# Patient Record
Sex: MALE | Race: WHITE | NOT HISPANIC OR LATINO | URBAN - METROPOLITAN AREA
[De-identification: names, ages, dates, MRNs, and addresses within clinical notes are randomized per-mention and may not be internally consistent; named-entity substitution may affect disease eponyms.]

---

## 2019-01-01 ENCOUNTER — EMERGENCY (EMERGENCY)
Facility: HOSPITAL | Age: 24
LOS: 1 days | End: 2019-01-01
Attending: EMERGENCY MEDICINE | Admitting: EMERGENCY MEDICINE
Payer: COMMERCIAL
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
## 2019-01-01 DIAGNOSIS — T71.161A: ICD-10-CM

## 2019-01-01 DIAGNOSIS — I46.9 CARDIAC ARREST, CAUSE UNSPECIFIED: ICD-10-CM

## 2019-01-01 LAB — GLUCOSE BLDC GLUCOMTR-MCNC: 27 MG/DL — CRITICAL LOW (ref 70–99)

## 2019-01-01 PROCEDURE — 99053 MED SERV 10PM-8AM 24 HR FAC: CPT

## 2019-01-01 PROCEDURE — 99285 EMERGENCY DEPT VISIT HI MDM: CPT | Mod: 25

## 2019-03-16 NOTE — ED PROVIDER NOTE - CLINICAL SUMMARY MEDICAL DECISION MAKING FREE TEXT BOX
Pt arrives in cardioresp arrest after found hanging, unclear how long he had been hanging. INtubated in the field, given fluids, epi, bicarb, D50 (found to have initially FS 27). Pronounced dead at 4:22 am, asystole and cardiac standstill on US.

## 2019-03-16 NOTE — ED ADULT TRIAGE NOTE - CHIEF COMPLAINT QUOTE
PT brought in by EMS for traumatic arrest. Per EMS, pt found by his girlfriend  hanging by his neck on bathroom doorway.

## 2019-03-16 NOTE — ED PROVIDER NOTE - PROGRESS NOTE DETAILS
Called [facility] [place], provided [place] case number [id number] but no  name yet. They will call back with  name. Father is Stevan Sequeira, I spoke to him, family lives in [place]. Denied any hx of depression of prior suicidal attempts.

## 2019-03-16 NOTE — ED PROVIDER NOTE - OBJECTIVE STATEMENT
25 yo male pt, unclear past med hx, BIBEMS for traumatic arrest. Found at home, hanging in the bathroom. Found by girlfriend. Last seen normal around 1:30 am. EMS at scene around 3:41 am and pt was already brought down. As per EMS was asystolic, FS 80 at scene, fisrt intubation attempt in esophagus, then intubated succesfully. Compressions, epi, given. C collar in place.

## 2023-02-11 NOTE — ED ADULT NURSE NOTE - PAIN: PRESENCE, MLM
· Nicotine patch ordered  · Smoking cessation encouraged non-verbal indicators of pain/discomfort absent